# Patient Record
Sex: FEMALE | Race: WHITE | NOT HISPANIC OR LATINO | Employment: OTHER | URBAN - METROPOLITAN AREA
[De-identification: names, ages, dates, MRNs, and addresses within clinical notes are randomized per-mention and may not be internally consistent; named-entity substitution may affect disease eponyms.]

---

## 2018-12-20 ENCOUNTER — APPOINTMENT (OUTPATIENT)
Dept: WOMENS IMAGING | Facility: HOSPITAL | Age: 63
End: 2018-12-20

## 2018-12-20 PROCEDURE — 77063 BREAST TOMOSYNTHESIS BI: CPT | Performed by: RADIOLOGY

## 2018-12-20 PROCEDURE — 77067 SCR MAMMO BI INCL CAD: CPT | Performed by: RADIOLOGY

## 2020-01-06 ENCOUNTER — APPOINTMENT (OUTPATIENT)
Dept: WOMENS IMAGING | Facility: HOSPITAL | Age: 65
End: 2020-01-06

## 2020-01-06 PROCEDURE — 77067 SCR MAMMO BI INCL CAD: CPT | Performed by: RADIOLOGY

## 2020-02-04 ENCOUNTER — OFFICE VISIT (OUTPATIENT)
Dept: ORTHOPEDIC SURGERY | Facility: CLINIC | Age: 65
End: 2020-02-04

## 2020-02-04 VITALS — HEIGHT: 62 IN | BODY MASS INDEX: 28.34 KG/M2 | WEIGHT: 154 LBS | TEMPERATURE: 98.3 F

## 2020-02-04 DIAGNOSIS — M25.512 LEFT SHOULDER PAIN, UNSPECIFIED CHRONICITY: ICD-10-CM

## 2020-02-04 DIAGNOSIS — M19.012 PRIMARY OSTEOARTHRITIS OF LEFT SHOULDER: Primary | ICD-10-CM

## 2020-02-04 PROCEDURE — 99203 OFFICE O/P NEW LOW 30 MIN: CPT | Performed by: PHYSICIAN ASSISTANT

## 2020-02-04 PROCEDURE — 73030 X-RAY EXAM OF SHOULDER: CPT | Performed by: PHYSICIAN ASSISTANT

## 2020-02-04 PROCEDURE — 20610 DRAIN/INJ JOINT/BURSA W/O US: CPT | Performed by: PHYSICIAN ASSISTANT

## 2020-02-04 RX ORDER — RANITIDINE 150 MG/1
TABLET ORAL
COMMUNITY

## 2020-02-04 RX ORDER — DEXAMETHASONE SODIUM PHOSPHATE 1 MG/ML
0.5 SOLUTION/ DROPS OPHTHALMIC
COMMUNITY

## 2020-02-04 RX ORDER — PHENAZOPYRIDINE HYDROCHLORIDE 200 MG/1
TABLET, FILM COATED ORAL
COMMUNITY

## 2020-02-04 RX ORDER — PREDNISONE 20 MG/1
TABLET ORAL
COMMUNITY

## 2020-02-04 RX ORDER — TRIAMCINOLONE ACETONIDE 1 MG/ML
LOTION TOPICAL
COMMUNITY

## 2020-02-04 RX ORDER — PREDNISOLONE ACETATE 10 MG/ML
SUSPENSION/ DROPS OPHTHALMIC
COMMUNITY

## 2020-02-04 RX ORDER — TAZAROTENE 1 MG/G
GEL CUTANEOUS
COMMUNITY
Start: 2019-10-30

## 2020-02-04 RX ORDER — ESCITALOPRAM OXALATE 10 MG/1
TABLET ORAL
COMMUNITY

## 2020-02-04 RX ORDER — GUAIFENESIN AND CODEINE PHOSPHATE 100; 10 MG/5ML; MG/5ML
SOLUTION ORAL
COMMUNITY

## 2020-02-04 RX ORDER — LOVASTATIN 40 MG/1
TABLET ORAL
COMMUNITY

## 2020-02-04 RX ORDER — ESTRADIOL 10 UG/1
INSERT VAGINAL
COMMUNITY
Start: 2020-01-25

## 2020-02-04 RX ORDER — PANTOPRAZOLE SODIUM 40 MG/1
TABLET, DELAYED RELEASE ORAL
COMMUNITY

## 2020-02-04 RX ADMIN — LIDOCAINE HYDROCHLORIDE 1 ML: 10 INJECTION, SOLUTION EPIDURAL; INFILTRATION; INTRACAUDAL; PERINEURAL at 10:00

## 2020-02-04 RX ADMIN — METHYLPREDNISOLONE ACETATE 80 MG: 80 INJECTION, SUSPENSION INTRA-ARTICULAR; INTRALESIONAL; INTRAMUSCULAR; SOFT TISSUE at 10:00

## 2020-02-05 NOTE — PROGRESS NOTES
{AS New Follow Up Note Header:74453}    Patient: Shanna Espinoza  ?  YOB: 1955    MRN: [id number]  ?  Chief Complaint   Patient presents with   • Left Shoulder - Pain   • Establish Care      ?  HPI:     Pain Location: {MAKK body part:45462}  Radiation: {asradiationpain:91732}  Quality: {asquality:48723}  Intensity/Severity: {asseveritypain:15385}  Duration: {Time:19024}  Onset quality: {asonsetquality:03464}  Timing: {astimin}  Aggravating Factors: {AS Aggravating Factors Ortho:79471}  Alleviating Factors: {AS Alleviating Factors:62448}  Previous Episodes: {aspreviousepisodes:56886}  Associated Symptoms: {asassociatedsymptoms:92039}  ADLs Affected: {ADL ASMEH:13401}  Previous Treatment: ***    This patient is {new/est pt:6020182563}.  This problem {IS/ IS NOT:64564} new to this examiner.      Allergies: No Known Allergies    Medications:   Home Medications:  Current Outpatient Medications on File Prior to Visit   Medication Sig   • Calcium Carbonate Antacid (ANTACID) 1177 MG chewable tablet Antacid Extra-Strength 200 mg calcium (500 mg) chewable tablet   take 3 tablets by mouth once daily   • dexamethasone (DECADRON) 0.1 % ophthalmic solution 0.5 mL.   • escitalopram (LEXAPRO) 10 MG tablet escitalopram 10 mg tablet   • guaiFENesin-codeine (CHERATUSSIN AC) 100-10 MG/5ML liquid Cheratussin AC 10 mg-100 mg/5 mL oral liquid   • IMVEXXY MAINTENANCE PACK 10 MCG insert    • lovastatin (MEVACOR) 40 MG tablet lovastatin 40 mg tablet   • Neomycin-Polymyxin-Dexameth 0.1 % ointment neomycin-polymyxin-dexameth 3.5 mg/mL-10,000 unit/mL-0.1% eye drops   • pantoprazole (PROTONIX) 40 MG EC tablet pantoprazole 40 mg tablet,delayed release   • phenazopyridine (PYRIDIUM) 200 MG tablet phenazopyridine 200 mg tablet   take 1 tablet by mouth three times a day   • prednisoLONE acetate (PRED FORTE) 1 % ophthalmic suspension prednisolone acetate 1 % eye drops,suspension   • predniSONE (DELTASONE) 20 MG tablet prednisone 20  "mg tablet   • pseudoephedrine-acetaminophen (TYLENOL SINUS)  MG tablet Asprin Ec Low Dose 81 mg tablet,delayed release   Take 1 tablet every day by oral route.   • raNITIdine (ZANTAC) 150 MG tablet ranitidine 150 mg tablet   • TAZORAC 0.1 % gel APPLY A PEA SIZED AMOUNT TO FACE NIGHTLY AS TOLERATED   • triamcinolone (KENALOG) 0.1 % lotion triamcinolone acetonide 0.1 % lotion   APPLY A THIN LAYER TO THE AFFECTED AREA(S) BY TOPICAL ROUTE 2 TIMES PER DAY: Not for use on face     No current facility-administered medications on file prior to visit.      Current Medications:  Scheduled Meds:  PRN Meds:.    I have reviewed the patient's medical history in detail and updated the computerized patient record.  Review and summarization of old records include:    History reviewed. No pertinent past medical history.  History reviewed. No pertinent surgical history.  Social History     Occupational History   • Not on file   Tobacco Use   • Smoking status: Never Smoker   • Smokeless tobacco: Never Used   Substance and Sexual Activity   • Alcohol use: Defer   • Drug use: Defer   • Sexual activity: Defer      Social History     Social History Narrative   • Not on file     History reviewed. No pertinent family history.      Review of Systems  Constitutional: Negative.    HENT: Negative.    Eyes: Negative.    Respiratory: Negative.    Cardiovascular: Negative.    Endocrine: Negative.    Musculoskeletal: Positive for arthralgias, decreased ROM, decreased strength.  Skin: Negative.    Allergic/Immunologic: Negative.    Neurological: Negative.    Hematological: Negative.    Psychiatric/Behavioral: Negative.         Wt Readings from Last 3 Encounters:   02/04/20 69.9 kg (154 lb)     Ht Readings from Last 3 Encounters:   02/04/20 157.5 cm (62\")     Body mass index is 28.17 kg/m².  Facility age limit for growth percentiles is 20 years.  Vitals:    02/04/20 0931   Temp: 98.3 °F (36.8 °C)     Physical Exam      Physical " Exam  Constitutional: Patient is oriented to person, place, and time. Appears well-developed and well-nourished.   HENT:   Head: Normocephalic and atraumatic.   Eyes: Conjunctivae and EOM are normal. Pupils are equal, round, and reactive to light.   Cardiovascular: Normal rate and intact distal pulses.   Pulmonary/Chest: Effort normal and breath sounds normal.   Musculoskeletal:   See detailed exam below   Neurological: Alert and oriented to person, place, and time. No sensory deficit. Coordination normal.   Skin: Skin is warm and dry. Capillary refill takes less than 2 seconds. No rash noted. No erythema.   Psychiatric: Patient has a normal mood and affect. Her behavior is normal. Judgment and thought content normal.   Nursing note and vitals reviewed.      Ortho Exam:   {Shoulder Exams [facility]/AS:50827}      Diagnostics:  {Diagnostics options AS:29799}       Assessment:  Shanna was seen today for establish care and pain.    Diagnoses and all orders for this visit:    Left shoulder pain, unspecified chronicity  -     XR Shoulder 2+ View Left          Procedures  ?    Plan    · {TREATMENT OPTIONS AS:91120}  · Rest, ice, compression, and elevation (RICE) therapy  · Stretching and strengthening exercises  · Alternate OTC Ibuprofen and Tylenol as needed/if clinically indicated  · Follow up in *** {WEEKS/MONTHS:92818}    Date of encounter: 02/04/2020   Brad Hackett PA-C    Electronically signed by Brad Hackett PA-C, 02/05/20, 7:55 AM.

## 2020-02-14 PROBLEM — M19.012 PRIMARY OSTEOARTHRITIS OF LEFT SHOULDER: Status: ACTIVE | Noted: 2020-02-14

## 2020-02-14 PROBLEM — M25.512 LEFT SHOULDER PAIN: Status: ACTIVE | Noted: 2020-02-14

## 2020-02-14 RX ORDER — METHYLPREDNISOLONE ACETATE 80 MG/ML
80 INJECTION, SUSPENSION INTRA-ARTICULAR; INTRALESIONAL; INTRAMUSCULAR; SOFT TISSUE
Status: COMPLETED | OUTPATIENT
Start: 2020-02-04 | End: 2020-02-04

## 2020-02-14 RX ORDER — LIDOCAINE HYDROCHLORIDE 10 MG/ML
1 INJECTION, SOLUTION EPIDURAL; INFILTRATION; INTRACAUDAL; PERINEURAL
Status: COMPLETED | OUTPATIENT
Start: 2020-02-04 | End: 2020-02-04

## 2020-02-14 NOTE — PROGRESS NOTES
NEW VISIT    Patient: Shanna Espinoza  ?  YOB: 1955    MRN: [id number]  ?  Chief Complaint   Patient presents with   • Left Shoulder - Pain   • Establish Care      ?  HPI:   Shanna Espinoza is a 64 y.o. female who presents with complaint of left shoulder pain for approximately 2 months.  She denies any injury to the shoulder. 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 She has been taking ibuprofen with some relief.     Pain Location: left shoulder  Radiation: into the lateral aspect   Quality: aching, burning  Intensity/Severity: mild-moderate  Duration: 2 months  Onset quality: gradual   Timing: intermittent  Aggravating Factors: overhead reaching, reaching backward, reaching across body, lifting heavy objects  Alleviating Factors: NSAIDs, rest  Previous Episodes: none mentioned  Associated Symptoms: pain, decreased ROM  ADLs Affected: work related activities, recreational activities/sports     This patient is a new patient.  This problem is new to this examiner.      Allergies: No Known Allergies    Medications:   Home Medications:  Current Outpatient Medications on File Prior to Visit   Medication Sig   • Calcium Carbonate Antacid (ANTACID) 1177 MG chewable tablet Antacid Extra-Strength 200 mg calcium (500 mg) chewable tablet   take 3 tablets by mouth once daily   • dexamethasone (DECADRON) 0.1 % ophthalmic solution 0.5 mL.   • escitalopram (LEXAPRO) 10 MG tablet escitalopram 10 mg tablet   • guaiFENesin-codeine (CHERATUSSIN AC) 100-10 MG/5ML liquid Cheratussin AC 10 mg-100 mg/5 mL oral liquid   • IMVEXXY MAINTENANCE PACK 10 MCG insert    • lovastatin (MEVACOR) 40 MG tablet lovastatin 40 mg tablet   • Neomycin-Polymyxin-Dexameth 0.1 % ointment neomycin-polymyxin-dexameth 3.5 mg/mL-10,000 unit/mL-0.1% eye drops   • pantoprazole (PROTONIX) 40 MG EC tablet pantoprazole 40 mg tablet,delayed release   • phenazopyridine (PYRIDIUM) 200 MG tablet phenazopyridine 200 mg tablet   take 1  tablet by mouth three times a day   • prednisoLONE acetate (PRED FORTE) 1 % ophthalmic suspension prednisolone acetate 1 % eye drops,suspension   • predniSONE (DELTASONE) 20 MG tablet prednisone 20 mg tablet   • pseudoephedrine-acetaminophen (TYLENOL SINUS)  MG tablet Asprin Ec Low Dose 81 mg tablet,delayed release   Take 1 tablet every day by oral route.   • raNITIdine (ZANTAC) 150 MG tablet ranitidine 150 mg tablet   • TAZORAC 0.1 % gel APPLY A PEA SIZED AMOUNT TO FACE NIGHTLY AS TOLERATED   • triamcinolone (KENALOG) 0.1 % lotion triamcinolone acetonide 0.1 % lotion   APPLY A THIN LAYER TO THE AFFECTED AREA(S) BY TOPICAL ROUTE 2 TIMES PER DAY: Not for use on face     No current facility-administered medications on file prior to visit.      Current Medications:  Scheduled Meds:  PRN Meds:.    I have reviewed the patient's medical history in detail and updated the computerized patient record.  Review and summarization of old records include:    Past Medical History:   Diagnosis Date   • Anxiety    • GERD (gastroesophageal reflux disease)    • Hyperlipidemia    • Vitamin D insufficiency      Past Surgical History:   Procedure Laterality Date   •  SECTION      5     Social History     Occupational History   • Not on file   Tobacco Use   • Smoking status: Never Smoker   • Smokeless tobacco: Never Used   Substance and Sexual Activity   • Alcohol use: Defer   • Drug use: Defer   • Sexual activity: Defer      Family History   Problem Relation Age of Onset   • Cancer Other    • Diabetes Other    • Rheumatologic disease Granddaughter          Review of Systems  Constitutional: Negative.    HENT: Negative.    Eyes: Negative.    Respiratory: Negative.    Cardiovascular: Negative.    Endocrine: Negative.    Musculoskeletal: Positive for arthralgias, decreased ROM, decreased strength.  Skin: Negative.    Allergic/Immunologic: Negative.    Neurological: Negative    Hematological: Negative.   "  Psychiatric/Behavioral: Negative.           Wt Readings from Last 3 Encounters:   02/04/20 69.9 kg (154 lb)     Ht Readings from Last 3 Encounters:   02/04/20 157.5 cm (62\")     Body mass index is 28.17 kg/m².  Facility age limit for growth percentiles is 20 years.  Vitals:    02/04/20 0931   Temp: 98.3 °F (36.8 °C)         Physical Exam  Constitutional: Patient is oriented to person, place, and time. Appears well-developed and well-nourished.   HENT:   Head: Normocephalic and atraumatic.   Eyes: Conjunctivae and EOM are normal. Pupils are equal, round, and reactive to light.   Cardiovascular: Normal rate and intact distal pulses.   Pulmonary/Chest: Effort normal and breath sounds normal.   Musculoskeletal:   See detailed exam below   Neurological: Alert and oriented to person, place, and time. No sensory deficit. Coordination normal.   Skin: Skin is warm and dry. Capillary refill takes less than 2 seconds. No rash noted. No erythema.   Psychiatric: Patient has a normal mood and affect. Her behavior is normal. Judgment and thought content normal.   Nursing note and vitals reviewed.      Ortho Exam:   Active and passive range of motion limited, although passive less than active.    There is obvious crepitus noted during motion testing.    Overall strength/rotator cuff function is mildly impaired.    AC joint is tender upon palpation.    Axillary nerve function is well-preserved.    There is slight winging of the scapula with hollowing of the fossae over the proximal and distal aspects of the spine of the scapula.    Active range of motion as follows: forward flexion is 0-120 degrees, active abduction is 0-110 degrees.    Drop arm sign is negative.    Skin and soft tissues are normal.    Patient's pain level is 5.          Diagnostics:  XR - 1 Result   I have personally reviewed   Results for orders placed in visit on 02/04/20   XR SHOULDER 2+ VW LEFT 2/4/2020    and my interpretation of the image is as follows: "   Findings: Moderate findings of osteoarthritis indicated by glenohumeral joint space narrowing but appears to be bone-on-bone  Bony lesion: no  Soft tissues: within normal limits  Joint spaces: decreased  Hardware appropriately positioned: not applicable  Prior studies available for comparison: no          Assessment:  Shanna was seen today for establish care and pain.    Diagnoses and all orders for this visit:    Primary osteoarthritis of left shoulder  -     Large Joint Arthrocentesis: L glenohumeral    Left shoulder pain, unspecified chronicity  -     XR Shoulder 2+ View Left  -     Large Joint Arthrocentesis: L glenohumeral          Large Joint Arthrocentesis: L glenohumeral  Date/Time: 2/4/2020 10:00 AM  Consent given by: patient  Site marked: site marked  Timeout: Immediately prior to procedure a time out was called to verify the correct patient, procedure, equipment, support staff and site/side marked as required   Supporting Documentation  Indications: pain   Procedure Details  Location: shoulder - L glenohumeral  Preparation: Patient was prepped and draped in the usual sterile fashion  Needle size: 25 G  Approach: posterior  Medications administered: 80 mg methylPREDNISolone acetate 80 MG/ML; 1 mL lidocaine PF 1% 1 %  Patient tolerance: patient tolerated the procedure well with no immediate complications        ?    Plan    · Natural history and expected course discussed. Questions answered.  · Reduction in offending activity.  · Gentle ROM exercises.  · Rest, ice, compression, and elevation (RICE) therapy.  · OTC analgesics as needed.  · Plain film x-rays performed, reviewed and discussed.  · Joint injection. See procedure note.  · Follow up in 3 months    Brad Hackett PA-C  Date of encounter: 02/04/2020     Electronically signed by Brad Hackett PA-C, 02/13/20, 10:24 PM.

## 2020-05-05 ENCOUNTER — OFFICE VISIT (OUTPATIENT)
Dept: ORTHOPEDIC SURGERY | Facility: CLINIC | Age: 65
End: 2020-05-05

## 2020-05-05 DIAGNOSIS — M19.012 PRIMARY OSTEOARTHRITIS OF LEFT SHOULDER: Primary | ICD-10-CM

## 2020-05-05 DIAGNOSIS — M79.671 RIGHT FOOT PAIN: ICD-10-CM

## 2020-05-05 PROCEDURE — 20610 DRAIN/INJ JOINT/BURSA W/O US: CPT | Performed by: PHYSICIAN ASSISTANT

## 2020-05-05 PROCEDURE — 99213 OFFICE O/P EST LOW 20 MIN: CPT | Performed by: PHYSICIAN ASSISTANT

## 2020-05-05 RX ADMIN — METHYLPREDNISOLONE ACETATE 160 MG: 80 INJECTION, SUSPENSION INTRA-ARTICULAR; INTRALESIONAL; INTRAMUSCULAR; SOFT TISSUE at 11:40

## 2020-05-05 RX ADMIN — LIDOCAINE HYDROCHLORIDE 2 ML: 10 INJECTION, SOLUTION EPIDURAL; INFILTRATION; INTRACAUDAL; PERINEURAL at 11:40

## 2020-05-11 PROBLEM — M79.671 RIGHT FOOT PAIN: Status: ACTIVE | Noted: 2020-05-11

## 2020-05-11 RX ORDER — LIDOCAINE HYDROCHLORIDE 10 MG/ML
2 INJECTION, SOLUTION EPIDURAL; INFILTRATION; INTRACAUDAL; PERINEURAL
Status: COMPLETED | OUTPATIENT
Start: 2020-05-05 | End: 2020-05-05

## 2020-05-11 RX ORDER — METHYLPREDNISOLONE ACETATE 80 MG/ML
160 INJECTION, SUSPENSION INTRA-ARTICULAR; INTRALESIONAL; INTRAMUSCULAR; SOFT TISSUE
Status: COMPLETED | OUTPATIENT
Start: 2020-05-05 | End: 2020-05-05

## 2020-05-11 NOTE — PROGRESS NOTES
FOLLOW UP VISIT    Patient: Shanna Espinoza  ?  YOB: 1955    MRN: [id number]  ?  Chief Complaint   Patient presents with   • Left Shoulder - Follow-up, Pain      ?  HPI:    64 y.o. female presents for follow up of left shoulder pain. I initially saw her 3 months ago, at which point the pain had been present for approximately 2 months.  She reports increased popping of the shoulder with movements since last visit. I injected her shoulder with steroid last visit and although it helped tremendously, it did not completely alleviate the pain. She also reports pain in the right foot x 2weeks after running into something with her foot.     Pain Location: left shoulder  Radiation: into the lateral aspect   Quality: aching, burning  Intensity/Severity: mild-moderate  Duration: 5 months  Onset quality: gradual   Timing: intermittent  Aggravating Factors: overhead reaching, reaching backward, reaching across body, lifting heavy objects  Alleviating Factors: NSAIDs, rest  Previous Episodes: none mentioned  Associated Symptoms: pain, decreased ROM  ADLs Affected: work related activities, recreational activities/sports   The above HPI elements are historical findings, although they have been updated to reflect current findings.    This patient is an established patient.  This problem is not new to this examiner.      Allergies: No Known Allergies    Medications:   Home Medications:  Current Outpatient Medications on File Prior to Visit   Medication Sig   • Calcium Carbonate Antacid (ANTACID) 1177 MG chewable tablet Antacid Extra-Strength 200 mg calcium (500 mg) chewable tablet   take 3 tablets by mouth once daily   • dexamethasone (DECADRON) 0.1 % ophthalmic solution 0.5 mL.   • escitalopram (LEXAPRO) 10 MG tablet escitalopram 10 mg tablet   • guaiFENesin-codeine (CHERATUSSIN AC) 100-10 MG/5ML liquid Cheratussin AC 10 mg-100 mg/5 mL oral liquid   • IMVEXXY MAINTENANCE PACK 10 MCG insert    • lovastatin (MEVACOR) 40 MG  tablet lovastatin 40 mg tablet   • Neomycin-Polymyxin-Dexameth 0.1 % ointment neomycin-polymyxin-dexameth 3.5 mg/mL-10,000 unit/mL-0.1% eye drops   • pantoprazole (PROTONIX) 40 MG EC tablet pantoprazole 40 mg tablet,delayed release   • phenazopyridine (PYRIDIUM) 200 MG tablet phenazopyridine 200 mg tablet   take 1 tablet by mouth three times a day   • prednisoLONE acetate (PRED FORTE) 1 % ophthalmic suspension prednisolone acetate 1 % eye drops,suspension   • predniSONE (DELTASONE) 20 MG tablet prednisone 20 mg tablet   • pseudoephedrine-acetaminophen (TYLENOL SINUS)  MG tablet Asprin Ec Low Dose 81 mg tablet,delayed release   Take 1 tablet every day by oral route.   • raNITIdine (ZANTAC) 150 MG tablet ranitidine 150 mg tablet   • TAZORAC 0.1 % gel APPLY A PEA SIZED AMOUNT TO FACE NIGHTLY AS TOLERATED   • triamcinolone (KENALOG) 0.1 % lotion triamcinolone acetonide 0.1 % lotion   APPLY A THIN LAYER TO THE AFFECTED AREA(S) BY TOPICAL ROUTE 2 TIMES PER DAY: Not for use on face     No current facility-administered medications on file prior to visit.      Current Medications:  Scheduled Meds:  PRN Meds:.    I have reviewed the patient's medical history in detail and updated the computerized patient record.  Review and summarization of old records include:    Past Medical History:   Diagnosis Date   • Anxiety    • GERD (gastroesophageal reflux disease)    • Hyperlipidemia    • Vitamin D insufficiency      Past Surgical History:   Procedure Laterality Date   •  SECTION      5     Social History     Occupational History   • Not on file   Tobacco Use   • Smoking status: Never Smoker   • Smokeless tobacco: Never Used   Substance and Sexual Activity   • Alcohol use: Defer   • Drug use: Defer   • Sexual activity: Defer      Family History   Problem Relation Age of Onset   • Cancer Other    • Diabetes Other    • Rheumatologic disease Granddaughter          Review of Systems  Constitutional: Negative.    HENT:  "Negative.    Eyes: Negative.    Respiratory: Negative.    Cardiovascular: Negative.    Endocrine: Negative.    Musculoskeletal: Positive for arthralgias.  Skin: Negative.    Allergic/Immunologic: Negative.    Neurological: Negative    Hematological: Negative.    Psychiatric/Behavioral: Negative.           Wt Readings from Last 3 Encounters:   02/04/20 69.9 kg (154 lb)     Ht Readings from Last 3 Encounters:   02/04/20 157.5 cm (62\")     There is no height or weight on file to calculate BMI.  No height and weight on file for this encounter.  There were no vitals filed for this visit.      Physical Exam  Constitutional: Patient is oriented to person, place, and time. Appears well-developed and well-nourished.   HENT:   Head: Normocephalic and atraumatic.   Eyes: Conjunctivae and EOM are normal. Pupils are equal, round, and reactive to light.   Cardiovascular: Intact distal pulses.   Pulmonary/Chest: Effort normal.   Musculoskeletal:   See detailed exam below   Neurological: Alert and oriented to person, place, and time. No sensory deficit. Coordination normal.   Skin: Skin is warm and dry. Capillary refill takes less than 2 seconds. No rash noted. No erythema.   Psychiatric: Patient has a normal mood and affect. Her behavior is normal. Judgment and thought content normal.   Nursing note and vitals reviewed.      Ortho Exam:   Active and passive range of motion limited, although passive less than active.    There is obvious crepitus noted during motion testing.    Overall strength/rotator cuff function is mildly impaired.    AC joint is tender upon palpation.    Axillary nerve function is well-preserved.    There is slight winging of the scapula with hollowing of the fossae over the proximal and distal aspects of the spine of the scapula.    Active range of motion as follows: forward flexion is 0-120 degrees, active abduction is 0-110 degrees.    Drop arm sign is negative.    Skin and soft tissues are normal.  "   Patient's pain level is 3.    The above exam is a historical finding, although remains the unchanged.       Assessment:  Shanna was seen today for follow-up and pain.    Diagnoses and all orders for this visit:    Primary osteoarthritis of left shoulder  -     Large Joint Arthrocentesis: L glenohumeral    Right foot pain  -     XR Toe 2+ View Right; Future  -     XR Foot 3+ View Right          Large Joint Arthrocentesis: L glenohumeral  Date/Time: 5/5/2020 11:40 AM  Consent given by: patient  Site marked: site marked  Timeout: Immediately prior to procedure a time out was called to verify the correct patient, procedure, equipment, support staff and site/side marked as required   Supporting Documentation  Indications: pain   Procedure Details  Location: shoulder - L glenohumeral  Preparation: Patient was prepped and draped in the usual sterile fashion  Needle size: 25 G  Approach: anteromedial  Medications administered: 160 mg methylPREDNISolone acetate 80 MG/ML; 2 mL lidocaine PF 1% 1 %  Patient tolerance: patient tolerated the procedure well with no immediate complications               Plan    Discussed further treatment for shoulder pain and expectations regarding symptoms. Since I administered a lower dose of steroid last visit I will do another injection with a higher dose to see if it provides better relief. Patient would like to proceed with this.   Left shoulder steroid injection was discussed with the patient. Discussed indication, risks, benefits, and alternatives. Verbal consent was given to proceed with the procedure.   Injection was performed from anteromedial approach.  Patient tolerated the procedure well and no complications were encountered.  Discussion of orthopedic goals and activities and patient/guardian expressed understanding.  Ice, heat, rest, compression and elevation of extremity as beneficial  nsaids and/or tylenol as beneficial  Instructed to refrain from heavy activity/rest the  extremity for the next 24-48 hours  Discussion regarding possibility of cortisol flare and what to expect if this occurs  Watch for signs and symptoms of infection  Call if adverse effect from injection therapy   · Reduction in offending activity  · Gentle ROM exercises  · Order given for xrays of the foot, to be performed at [facility] or [facility], if she decides to do so.  · Follow up in 3 months    Brad Hackett PA-C  Date of encounter: 05/05/2020     Electronically signed by Brad Hackett PA-C, 05/11/20, 8:19 AM.

## 2020-05-12 ENCOUNTER — TELEPHONE (OUTPATIENT)
Dept: ORTHOPEDIC SURGERY | Facility: CLINIC | Age: 65
End: 2020-05-12

## 2020-05-12 DIAGNOSIS — S92.501A CLOSED FRACTURE OF PHALANX OF RIGHT FIFTH TOE, INITIAL ENCOUNTER: Primary | ICD-10-CM

## 2020-05-12 NOTE — TELEPHONE ENCOUNTER
Left message advising her right fifth toe shows a minimally displaced and comminuted fracture, I explained this in layman's terms.  I advised she can mary tape the toes as well as come by and  a short cam walking boot for protection.  She will need to wear this for approximately 3 to 4 weeks for protection and support.

## 2020-06-04 DIAGNOSIS — S92.501A CLOSED FRACTURE OF PHALANX OF RIGHT FIFTH TOE, INITIAL ENCOUNTER: Primary | ICD-10-CM

## 2020-06-08 ENCOUNTER — HOSPITAL ENCOUNTER (OUTPATIENT)
Dept: OTHER | Facility: HOSPITAL | Age: 65
Discharge: HOME | End: 2020-06-08
Attending: PHYSICIAN ASSISTANT

## 2020-06-08 ENCOUNTER — OFFICE VISIT (OUTPATIENT)
Dept: ORTHOPEDIC SURGERY | Facility: CLINIC | Age: 65
End: 2020-06-08

## 2020-06-08 VITALS — BODY MASS INDEX: 26.68 KG/M2 | WEIGHT: 145 LBS | HEIGHT: 62 IN | TEMPERATURE: 97.1 F

## 2020-06-08 DIAGNOSIS — S92.501D CLOSED FRACTURE OF PHALANX OF RIGHT FIFTH TOE WITH ROUTINE HEALING, SUBSEQUENT ENCOUNTER: Primary | ICD-10-CM

## 2020-06-08 PROCEDURE — 99213 OFFICE O/P EST LOW 20 MIN: CPT | Performed by: PHYSICIAN ASSISTANT

## 2020-06-17 PROBLEM — S92.501A CLOSED FRACTURE OF FIFTH TOE OF RIGHT FOOT: Status: ACTIVE | Noted: 2020-06-17

## 2020-06-17 NOTE — PROGRESS NOTES
NEW VISIT    Patient: Shanna Espinoza  ?  YOB: 1955    MRN: [id number]  ?  Chief Complaint   Patient presents with   • Right Foot - Pain, Follow-up      ?  HPI: Shanna Espinoza is a 64 y.o. female who presents for new complaint of right foot pain, particularly of the fifth toe.  At her last visit with me she mentioned she had an injury to the foot and I ordered x-rays to be done outside of our office.  Once those x-rays came back I notified her there was a right fifth toe fracture and I placed her into a short cam walking boot.  She is now been in the boot for 4 weeks and is reporting a significant improvement in discomfort.  She has been putting some weight on the foot with the boot on has done fairly well.  Today is her first official visit for this fracture.      Pain Location: right foot  Radiation: none  Quality: aching  Intensity/Severity: mild   Duration: 6-7 weeks  Onset quality: sudden  Timing: constant  Aggravating Factors: any weight bearing, walking, standing  Alleviating Factors: Tylenol, NSAIDs, rest, brace  Previous Episodes: none mentioned  Associated Symptoms: pain, swelling  ADLs Affected: grooming/hygiene/toileting/personal care, ambulating, work related activities, recreational activities/sports      This patient is an established patient.  This problem is new to this examiner.      Allergies: No Known Allergies    Medications:   Home Medications:  Current Outpatient Medications on File Prior to Visit   Medication Sig   • Calcium Carbonate Antacid (ANTACID) 1177 MG chewable tablet Antacid Extra-Strength 200 mg calcium (500 mg) chewable tablet   take 3 tablets by mouth once daily   • dexamethasone (DECADRON) 0.1 % ophthalmic solution 0.5 mL.   • escitalopram (LEXAPRO) 10 MG tablet escitalopram 10 mg tablet   • guaiFENesin-codeine (CHERATUSSIN AC) 100-10 MG/5ML liquid Cheratussin AC 10 mg-100 mg/5 mL oral liquid   • IMVEXXY MAINTENANCE PACK 10 MCG insert    • lovastatin (MEVACOR) 40 MG  tablet lovastatin 40 mg tablet   • Neomycin-Polymyxin-Dexameth 0.1 % ointment neomycin-polymyxin-dexameth 3.5 mg/mL-10,000 unit/mL-0.1% eye drops   • pantoprazole (PROTONIX) 40 MG EC tablet pantoprazole 40 mg tablet,delayed release   • phenazopyridine (PYRIDIUM) 200 MG tablet phenazopyridine 200 mg tablet   take 1 tablet by mouth three times a day   • prednisoLONE acetate (PRED FORTE) 1 % ophthalmic suspension prednisolone acetate 1 % eye drops,suspension   • predniSONE (DELTASONE) 20 MG tablet prednisone 20 mg tablet   • pseudoephedrine-acetaminophen (TYLENOL SINUS)  MG tablet Asprin Ec Low Dose 81 mg tablet,delayed release   Take 1 tablet every day by oral route.   • raNITIdine (ZANTAC) 150 MG tablet ranitidine 150 mg tablet   • TAZORAC 0.1 % gel APPLY A PEA SIZED AMOUNT TO FACE NIGHTLY AS TOLERATED   • triamcinolone (KENALOG) 0.1 % lotion triamcinolone acetonide 0.1 % lotion   APPLY A THIN LAYER TO THE AFFECTED AREA(S) BY TOPICAL ROUTE 2 TIMES PER DAY: Not for use on face     No current facility-administered medications on file prior to visit.      Current Medications:  Scheduled Meds:  PRN Meds:.    I have reviewed the patient's medical history in detail and updated the computerized patient record.  Review and summarization of old records include:    Past Medical History:   Diagnosis Date   • Anxiety    • GERD (gastroesophageal reflux disease)    • Hyperlipidemia    • Vitamin D insufficiency      Past Surgical History:   Procedure Laterality Date   •  SECTION      5     Social History     Occupational History   • Not on file   Tobacco Use   • Smoking status: Never Smoker   • Smokeless tobacco: Never Used   Substance and Sexual Activity   • Alcohol use: Defer   • Drug use: Defer   • Sexual activity: Defer     Family History   Problem Relation Age of Onset   • Cancer Other    • Diabetes Other    • Rheumatologic disease Granddaughter          Review of Systems   Constitutional: Negative.  Negative for  "fever.   Eyes: Negative.    Respiratory: Negative.    Cardiovascular: Negative.    Endocrine: Negative.    Musculoskeletal: Positive for arthralgias, gait problem and joint swelling.   Skin: Positive for color change. Negative for rash and wound.   Allergic/Immunologic: Negative.    Neurological: Negative for numbness.   Hematological: Negative.    Psychiatric/Behavioral: Negative.           Wt Readings from Last 3 Encounters:   06/08/20 65.8 kg (145 lb)   02/04/20 69.9 kg (154 lb)     Ht Readings from Last 3 Encounters:   06/08/20 157.5 cm (62\")   02/04/20 157.5 cm (62\")     Body mass index is 26.52 kg/m².  Facility age limit for growth percentiles is 20 years.  Vitals:    06/08/20 1323   Temp: 97.1 °F (36.2 °C)         Physical Exam  Constitutional: Patient is oriented to person, place, and time. Appears well-developed and well-nourished.   HENT:   Head: Normocephalic and atraumatic.   Eyes: Conjunctivae and EOM are normal. Pupils are equal, round, and reactive to light.   Cardiovascular: Normal rate and intact distal pulses.   Pulmonary/Chest: Effort normal.   Musculoskeletal:   See detailed exam below   Neurological: Alert and oriented to person, place, and time. No sensory deficit. Coordination normal.   Skin: Skin is warm and dry. Capillary refill takes less than 2 seconds. No rash noted. No erythema.   Psychiatric: Patient has a normal mood and affect. Her behavior is normal. Judgment and thought content normal.   Nursing note and vitals reviewed.        Ortho Exam:   Right foot  Positive for swelling and tenderness of the right foot, particularly noted over the fifth phalanx.   Lisfranc joint is stable.    Appropriate amounts of swelling is noted.  All bruising has resolved at this point.  There is no evidence of a compartmental syndrome or instability of the midfoot.   Dorsalis pedis and posterior tibial artery pulses are palpable.   Common peroneal nerve function is well preserved.   Dorsiflexion and " plantarflexion are both mildly restricted due to pain associated with the fifth phalanx.    The arches of the foot are fairly well preserved.   Patient's gait is cautious and antalgic.   The ankle mortise is stable.       Diagnostics:  Foot x-rays were ordered by Brad Hackett PA-C and performed at [facility]. These images were independently viewed and interpreted by myself, my impression as follows:  4v Right Foot xrays:  Findings: Evidence of good callus formation at the base of the proximal phalanx where a comminuted fracture is noted.  Bony lesion: no  Soft tissues: within normal limits  Joint spaces: within normal limits  Hardware appropriately positioned: not applicable  Prior studies available for comparison: yes     Assessment:  Shanna was seen today for pain and follow-up.    Diagnoses and all orders for this visit:    Closed fracture of phalanx of right fifth toe with routine healing, subsequent encounter  -     XR Foot 3+ View Right; Future      ?    Plan    · She was instructed to continue with Cam walking boot and to weight-bear as tolerated over the next 2 weeks and then gradually return to her normal shoes  · Closed treatment of fracture and or dislocation.  • Discussion of orthopaedic goals and activities and patient and/or guardian expressed appreciation.  • Ice, heat, and/or modalities as beneficial  • Cast, splint or brace care and assistive device usage instructions given  • Reduced physical activity as appropriate and avoid offending activity  • Weight bearing parameters reviewed  • Reinjury Precautions  • Patient is encouraged to call or return for any issues or concerns.  • No brace was given at today's visit.  • Follow up in 4 weeks    Date of encounter: 06/08/2020   Brad Hackett PA-C      Electronically signed by Brad Hackett PA-C, 06/17/20, 7:41 AM.

## 2020-07-06 ENCOUNTER — HOSPITAL ENCOUNTER (OUTPATIENT)
Dept: OTHER | Facility: HOSPITAL | Age: 65
Discharge: HOME | End: 2020-07-06
Attending: PHYSICIAN ASSISTANT

## 2020-07-06 ENCOUNTER — OFFICE VISIT (OUTPATIENT)
Dept: ORTHOPEDIC SURGERY | Facility: CLINIC | Age: 65
End: 2020-07-06

## 2020-07-06 VITALS — WEIGHT: 145 LBS | BODY MASS INDEX: 26.68 KG/M2 | HEIGHT: 62 IN | TEMPERATURE: 97.5 F

## 2020-07-06 DIAGNOSIS — S92.501D CLOSED FRACTURE OF PHALANX OF RIGHT FIFTH TOE WITH ROUTINE HEALING, SUBSEQUENT ENCOUNTER: Primary | ICD-10-CM

## 2020-07-06 PROCEDURE — 99024 POSTOP FOLLOW-UP VISIT: CPT | Performed by: PHYSICIAN ASSISTANT

## 2020-07-16 NOTE — PROGRESS NOTES
FRACTURE CARE VISIT       NAME: Shanna Espinoza           : 1955    MRN: [id number]      Chief Complaint   Patient presents with   • Right Foot - Follow-up, Pain     Date of Fracture: Mid to late 2020  ?     HPI  Shanna Espinoza presents for 8 week follow up s/p fracture of right fifth toe. She has had no current complaints. She reports overall a significant improvement in pain.  She reports she wore the boot for another couple of weeks and has transition out of it without problem.        Physical Exam  General: alert, appears stated age, cooperative and no distress  Physical Exam:  Signs of infection: [x] no                  [] yes   Swelling: [x] no                  [] yes   Skin wound: [] healing well   [] healed well   [x] skin intact    Motor exam intact: [] no                  [x] yes   Neurovascular exam intact: [] no                  [x] yes   Signs of compartment syndrome: [x] no                  [] yes   Signs of DVT: [x] no                  [] yes   Ecchymosis: [x] no                  [] yes     Musculoskeletal:   Right foot  Negative for swelling but positive for minimal tenderness of the right foot, particularly noted over the fifth phalanx.   Lisfranc joint is stable.     All bruising has resolved at this point.  There is no evidence of a compartmental syndrome or instability of the midfoot.   Dorsalis pedis and posterior tibial artery pulses are palpable.   Common peroneal nerve function is well preserved.   Dorsiflexion and plantarflexion are both mildly restricted due to pain associated with the fifth phalanx.    The arches of the foot are fairly well preserved.   Patient's gait is cautious and antalgic.   The ankle mortise is stable.       Diagnostic Data:  Right foot xrays 3views were ordered by Brad Hackett PA-C and performed at [facility]. These images were independently viewed and interpreted by myself, my impression as follows:  Findings: Great callus  formation of base of the proximal fifth phalanx right foot  Bony lesion: no  Soft tissues: within normal limits  Joint spaces: within normal limits  Hardware appropriately positioned: not applicable  Prior studies available for comparison: Yes with noticeable improvement        Assessment/Plan   Assessment:    1. Closed fracture of phalanx of right fifth toe with routine healing, subsequent encounter          Plan:   Orders Placed This Encounter   Procedures   • XR Toe 2+ View Right   • XR Foot 3+ View Right      • Discussion of orthopaedic goals and activities.  • Risk, benefits, and merits of treatment alternatives reviewed with the patient.  • Ice, heat, and/or modalities as beneficial  • Elevate foot for residual swelling  • Patient is encouraged to call or return for any issues or concerns.  •   • Follow up in 4 weeks         Brad Hackett PA-C  07/06/2020     Electronically signed by Brad Hackett PA-C, 07/15/20, 9:52 PM.    EMR Dragon/Transcription disclaimer:  Much of this encounter note is an electronic transcription/translation of spoken language to printed text. The electronic translation of spoken language may permit erroneous, or at times, nonsensical words or phrases to be inadvertently transcribed; Although I have reviewed the note for such errors, some may still exist.

## 2020-08-05 ENCOUNTER — OFFICE VISIT (OUTPATIENT)
Dept: ORTHOPEDIC SURGERY | Facility: CLINIC | Age: 65
End: 2020-08-05

## 2020-08-05 ENCOUNTER — HOSPITAL ENCOUNTER (OUTPATIENT)
Dept: OTHER | Facility: HOSPITAL | Age: 65
Discharge: HOME | End: 2020-08-05
Attending: PHYSICIAN ASSISTANT

## 2020-08-05 VITALS — BODY MASS INDEX: 26.68 KG/M2 | HEIGHT: 62 IN | WEIGHT: 145 LBS

## 2020-08-05 DIAGNOSIS — S92.501D CLOSED FRACTURE OF PHALANX OF RIGHT FIFTH TOE WITH ROUTINE HEALING, SUBSEQUENT ENCOUNTER: Primary | ICD-10-CM

## 2020-08-05 DIAGNOSIS — M19.012 PRIMARY OSTEOARTHRITIS OF LEFT SHOULDER: ICD-10-CM

## 2020-08-05 DIAGNOSIS — M12.812 ROTATOR CUFF ARTHROPATHY OF LEFT SHOULDER: ICD-10-CM

## 2020-08-05 PROCEDURE — 99213 OFFICE O/P EST LOW 20 MIN: CPT | Performed by: NURSE PRACTITIONER

## 2020-08-05 PROCEDURE — 20610 DRAIN/INJ JOINT/BURSA W/O US: CPT | Performed by: NURSE PRACTITIONER

## 2020-08-05 RX ORDER — METHYLPREDNISOLONE ACETATE 80 MG/ML
160 INJECTION, SUSPENSION INTRA-ARTICULAR; INTRALESIONAL; INTRAMUSCULAR; SOFT TISSUE
Status: COMPLETED | OUTPATIENT
Start: 2020-08-05 | End: 2020-08-05

## 2020-08-05 RX ORDER — LIDOCAINE HYDROCHLORIDE 10 MG/ML
2 INJECTION, SOLUTION INFILTRATION; PERINEURAL
Status: COMPLETED | OUTPATIENT
Start: 2020-08-05 | End: 2020-08-05

## 2020-08-05 RX ADMIN — LIDOCAINE HYDROCHLORIDE 2 ML: 10 INJECTION, SOLUTION INFILTRATION; PERINEURAL at 11:07

## 2020-08-05 RX ADMIN — METHYLPREDNISOLONE ACETATE 160 MG: 80 INJECTION, SUSPENSION INTRA-ARTICULAR; INTRALESIONAL; INTRAMUSCULAR; SOFT TISSUE at 11:07

## 2020-08-05 NOTE — PROGRESS NOTES
Large Joint Arthrocentesis: L subacromial bursa  Date/Time: 8/5/2020 11:07 AM  Consent given by: patient  Site marked: site marked  Timeout: Immediately prior to procedure a time out was called to verify the correct patient, procedure, equipment, support staff and site/side marked as required   Supporting Documentation  Indications: pain   Procedure Details  Location: shoulder - L subacromial bursa  Preparation: Patient was prepped and draped in the usual sterile fashion  Needle size: 25 G  Approach: anteromedial  Medications administered: 2 mL lidocaine 1 %; 160 mg methylPREDNISolone acetate 80 MG/ML  Patient tolerance: patient tolerated the procedure well with no immediate complications        Chief Complaint   Patient presents with   • Left Shoulder - Follow-up   • Right Foot - Pain       History of Present Illness  HPI: Shanna Espinoza is a 65 y.o. female present today for follow up for right fifth phalynx fracture     Review of Systems   Constitutional: Negative.    HENT: Negative.    Cardiovascular: Negative.    Musculoskeletal: Positive for joint swelling. Negative for arthralgias, back pain and gait problem.   Skin: Negative.    Allergic/Immunologic: Negative.    Neurological: Negative.    Hematological: Negative.    Psychiatric/Behavioral: Negative.        Patient Active Problem List   Diagnosis   • Left shoulder pain   • Primary osteoarthritis of left shoulder   • Right foot pain   • Closed fracture of fifth toe of right foot     Past Medical History:   Diagnosis Date   • Anxiety    • GERD (gastroesophageal reflux disease)    • Hyperlipidemia    • Vitamin D insufficiency      Social History     Socioeconomic History   • Marital status:      Spouse name: Not on file   • Number of children: Not on file   • Years of education: Not on file   • Highest education level: Not on file   Tobacco Use   • Smoking status: Never Smoker   • Smokeless tobacco: Never Used   Substance and Sexual Activity   • Alcohol  use: Defer   • Drug use: Defer   • Sexual activity: Defer     Past Surgical History:   Procedure Laterality Date   •  SECTION      5       Current Outpatient Medications:   •  Calcium Carbonate Antacid (ANTACID) 1177 MG chewable tablet, Antacid Extra-Strength 200 mg calcium (500 mg) chewable tablet  take 3 tablets by mouth once daily, Disp: , Rfl:   •  dexamethasone (DECADRON) 0.1 % ophthalmic solution, 0.5 mL., Disp: , Rfl:   •  escitalopram (LEXAPRO) 10 MG tablet, escitalopram 10 mg tablet, Disp: , Rfl:   •  guaiFENesin-codeine (CHERATUSSIN AC) 100-10 MG/5ML liquid, Cheratussin AC 10 mg-100 mg/5 mL oral liquid, Disp: , Rfl:   •  IMVEXXY MAINTENANCE PACK 10 MCG insert, , Disp: , Rfl:   •  lovastatin (MEVACOR) 40 MG tablet, lovastatin 40 mg tablet, Disp: , Rfl:   •  Neomycin-Polymyxin-Dexameth 0.1 % ointment, neomycin-polymyxin-dexameth 3.5 mg/mL-10,000 unit/mL-0.1% eye drops, Disp: , Rfl:   •  pantoprazole (PROTONIX) 40 MG EC tablet, pantoprazole 40 mg tablet,delayed release, Disp: , Rfl:   •  phenazopyridine (PYRIDIUM) 200 MG tablet, phenazopyridine 200 mg tablet  take 1 tablet by mouth three times a day, Disp: , Rfl:   •  prednisoLONE acetate (PRED FORTE) 1 % ophthalmic suspension, prednisolone acetate 1 % eye drops,suspension, Disp: , Rfl:   •  predniSONE (DELTASONE) 20 MG tablet, prednisone 20 mg tablet, Disp: , Rfl:   •  pseudoephedrine-acetaminophen (TYLENOL SINUS)  MG tablet, Asprin Ec Low Dose 81 mg tablet,delayed release  Take 1 tablet every day by oral route., Disp: , Rfl:   •  raNITIdine (ZANTAC) 150 MG tablet, ranitidine 150 mg tablet, Disp: , Rfl:   •  TAZORAC 0.1 % gel, APPLY A PEA SIZED AMOUNT TO FACE NIGHTLY AS TOLERATED, Disp: , Rfl:   •  triamcinolone (KENALOG) 0.1 % lotion, triamcinolone acetonide 0.1 % lotion  APPLY A THIN LAYER TO THE AFFECTED AREA(S) BY TOPICAL ROUTE 2 TIMES PER DAY: Not for use on face, Disp: , Rfl:   No Known Allergies  Vitals:    20 1106   Weight: 65.8  "kg (145 lb)   Height: 157.5 cm (62\")       Physical Exam   Constitutional: She is oriented to person, place, and time. She appears well-developed and well-nourished.   HENT:   Head: Normocephalic.   Pulmonary/Chest: Effort normal.   Musculoskeletal: She exhibits edema and tenderness.        Left shoulder: She exhibits tenderness, crepitus, deformity, laceration, pain, spasm and decreased strength. She exhibits normal range of motion, no bony tenderness, no swelling, no effusion and normal pulse.   Right foot 5th phalynx nttp and no deformity.   Neurological: She is alert and oriented to person, place, and time. She displays normal reflexes. No cranial nerve deficit or sensory deficit. She exhibits normal muscle tone. Coordination normal.   Skin: Skin is warm and dry.   Psychiatric: She has a normal mood and affect. Her behavior is normal. Judgment and thought content normal.   Nursing note and vitals reviewed.        Assessment  Encounter Diagnoses   Name Primary?   • Closed fracture of phalanx of right fifth toe with routine healing, subsequent encounter Yes   • Primary osteoarthritis of left shoulder    • Rotator cuff arthropathy of left shoulder          Discussion/Summary      End of Encounter Orders  Orders Placed This Encounter   Procedures   • Large Joint Arthrocentesis: L subacromial bursa     This order was created via procedure documentation       Follow Up  Patient was told to schedule follow up in 3month(s)        DEBBY Mckeon   "

## 2020-11-04 ENCOUNTER — CLINICAL SUPPORT (OUTPATIENT)
Dept: ORTHOPEDIC SURGERY | Facility: CLINIC | Age: 65
End: 2020-11-04

## 2020-11-04 VITALS — HEIGHT: 62 IN | TEMPERATURE: 97.3 F | BODY MASS INDEX: 26.68 KG/M2 | WEIGHT: 145 LBS

## 2020-11-04 DIAGNOSIS — M19.012 PRIMARY OSTEOARTHRITIS OF LEFT SHOULDER: Primary | ICD-10-CM

## 2020-11-04 PROCEDURE — 20610 DRAIN/INJ JOINT/BURSA W/O US: CPT | Performed by: PHYSICIAN ASSISTANT

## 2020-11-04 RX ORDER — METHYLPREDNISOLONE ACETATE 80 MG/ML
160 INJECTION, SUSPENSION INTRA-ARTICULAR; INTRALESIONAL; INTRAMUSCULAR; SOFT TISSUE
Status: COMPLETED | OUTPATIENT
Start: 2020-11-04 | End: 2020-11-04

## 2020-11-04 RX ORDER — VALACYCLOVIR HYDROCHLORIDE 1 G/1
TABLET, FILM COATED ORAL
COMMUNITY
Start: 2020-10-24

## 2020-11-04 RX ORDER — LOVASTATIN 20 MG/1
TABLET ORAL
COMMUNITY
Start: 2020-10-05

## 2020-11-04 RX ADMIN — METHYLPREDNISOLONE ACETATE 160 MG: 80 INJECTION, SUSPENSION INTRA-ARTICULAR; INTRALESIONAL; INTRAMUSCULAR; SOFT TISSUE at 09:41

## 2020-11-04 NOTE — PROGRESS NOTES
Procedure   Large Joint Arthrocentesis: L glenohumeral  Date/Time: 11/4/2020 9:41 AM  Consent given by: patient  Site marked: site marked  Timeout: Immediately prior to procedure a time out was called to verify the correct patient, procedure, equipment, support staff and site/side marked as required   Supporting Documentation  Indications: pain and joint swelling   Procedure Details  Location: shoulder - L glenohumeral  Preparation: Patient was prepped and draped in the usual sterile fashion  Needle size: 25 G  Approach: posterior  Medications administered: 2 mL lidocaine (cardiac); 160 mg methylPREDNISolone acetate 80 MG/ML  Patient tolerance: patient tolerated the procedure well with no immediate complications      Electronically signed by Brad Hackett PA-C, 11/04/20, 11:15 AM EST.

## 2020-11-04 NOTE — PROGRESS NOTES
INJECTION      Patient: Shanna Espinoza    MRN: [id number]    YOB: 1955    Chief Complaint   Patient presents with   • Left Shoulder - Follow-up, Pain   • Injections         History of Present Illness:  Shanna Espinoza is here today for injection therapy. She receives LEFT shoulder injections of steroid. Since last injection, patient notes substantial relief of symptoms. She inquires today about whether she needs an MRI of the shoulder to see how bad the joint is. Treatment options have been discussed and she understands and consents.       Physical Exam:   65 y.o. female awake, alert, oriented, in no acute distress and well developed, well nourished.  LEFT shoulder  Active and passive range of motion limited, although passive less than active.    There is obvious crepitus noted during motion testing.    Overall strength/rotator cuff function is mildly impaired.    AC joint is tender upon palpation.    Axillary nerve function is well-preserved.    There is slight winging of the scapula with hollowing of the fossae over the proximal and distal aspects of the spine of the scapula.    Active range of motion as follows: forward flexion is 0-120 degrees, active abduction is 0-110 degrees.    Drop arm sign is negative.    Skin and soft tissues are normal.    Patient's pain level is 4.      Procedures  See separate procedure note  Injection site was identified by physical examination and cleaned with Betadine and alcohol swabs. Prior to needle insertion, ethyl chloride spray was used for surface anesthesia. Sterile technique was used.       ASSESSMENT:  Diagnoses and all orders for this visit:    1. Primary osteoarthritis of left shoulder (Primary)  -     Large Joint Arthrocentesis: L glenohumeral          PLAN:   • Left shoulder steroid injection was discussed with the patient. Discussed indication, risks, benefits, and alternatives. Verbal consent was given to proceed with the procedure.   • Injection was  performed from posterior approach.  Patient tolerated the procedure well and no complications were encountered.  • Discussion of orthopedic goals and activities and patient/guardian expressed understanding.  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 Eventually she will need a reverse total shoulder arthroplasty and she is aware of that.  • Follow up in 3 months      Brad Hackett PA-C  Encounter Date: 11/4/2020    Electronically signed by Brad Hackett PA-C, 11/04/20, 11:17 AM EST.      MARY Dragon/Transcription disclaimer:  Much of this encounter note is an electronic transcription/translation of spoken language to printed text. The electronic translation of spoken language may permit erroneous, or at times, nonsensical words or phrases to be inadvertently transcribed; Although I have reviewed the note for such errors, some may still exist.

## 2021-01-08 ENCOUNTER — APPOINTMENT (OUTPATIENT)
Dept: WOMENS IMAGING | Facility: HOSPITAL | Age: 66
End: 2021-01-08

## 2021-01-08 PROCEDURE — 77067 SCR MAMMO BI INCL CAD: CPT | Performed by: RADIOLOGY

## 2021-01-08 PROCEDURE — 77063 BREAST TOMOSYNTHESIS BI: CPT | Performed by: RADIOLOGY

## 2021-02-17 ENCOUNTER — CLINICAL SUPPORT (OUTPATIENT)
Dept: ORTHOPEDIC SURGERY | Facility: CLINIC | Age: 66
End: 2021-02-17

## 2021-02-17 VITALS — TEMPERATURE: 95.7 F | WEIGHT: 145 LBS | HEIGHT: 62 IN | BODY MASS INDEX: 26.68 KG/M2

## 2021-02-17 DIAGNOSIS — M19.012 PRIMARY OSTEOARTHRITIS OF LEFT SHOULDER: Primary | ICD-10-CM

## 2021-02-17 PROCEDURE — 20610 DRAIN/INJ JOINT/BURSA W/O US: CPT | Performed by: PHYSICIAN ASSISTANT

## 2021-02-17 RX ORDER — LIDOCAINE HYDROCHLORIDE 10 MG/ML
2 INJECTION, SOLUTION INFILTRATION; PERINEURAL
Status: COMPLETED | OUTPATIENT
Start: 2021-02-17 | End: 2021-02-17

## 2021-02-17 RX ORDER — METHYLPREDNISOLONE ACETATE 80 MG/ML
160 INJECTION, SUSPENSION INTRA-ARTICULAR; INTRALESIONAL; INTRAMUSCULAR; SOFT TISSUE
Status: COMPLETED | OUTPATIENT
Start: 2021-02-17 | End: 2021-02-17

## 2021-02-17 RX ADMIN — LIDOCAINE HYDROCHLORIDE 2 ML: 10 INJECTION, SOLUTION INFILTRATION; PERINEURAL at 09:23

## 2021-02-17 RX ADMIN — METHYLPREDNISOLONE ACETATE 160 MG: 80 INJECTION, SUSPENSION INTRA-ARTICULAR; INTRALESIONAL; INTRAMUSCULAR; SOFT TISSUE at 09:23

## 2021-02-17 NOTE — PROGRESS NOTES
Procedure   Large Joint Arthrocentesis: L glenohumeral  Date/Time: 2/17/2021 9:23 AM  Consent given by: patient  Site marked: site marked  Timeout: Immediately prior to procedure a time out was called to verify the correct patient, procedure, equipment, support staff and site/side marked as required   Supporting Documentation  Indications: pain   Procedure Details  Location: shoulder - L glenohumeral  Preparation: Patient was prepped and draped in the usual sterile fashion  Needle size: 25 G  Approach: posterior  Medications administered: 2 mL lidocaine 1 %; 160 mg methylPREDNISolone acetate 80 MG/ML  Patient tolerance: patient tolerated the procedure well with no immediate complications      Electronically signed by Brad Hackett PA-C, 02/17/21, 9:50 AM EST.

## 2021-02-17 NOTE — PROGRESS NOTES
"Chief Complaint  Follow-up and Pain of the Left Shoulder and Injections (left shoulder)    Subjective    History of Present Illness      Shanna Espinoza is a 65 y.o. female who presents to [facility] for is here today for injection therapy. She receives LEFT shoulder injections of steroid. Since last injection, patient notes substantial relief of symptoms.  She reports she can tell the shot is just starting to wear off. Treatment options have been discussed and she understands and consents.       Objective   Vital Signs:   Temp 95.7 °F (35.4 °C)   Ht 157.5 cm (62\")   Wt 65.8 kg (145 lb)   BMI 26.52 kg/m²     Physical Exam  65 y.o. female is awake, alert, oriented, in no acute distress and well developed, well nourished.  Ortho Exam   LEFT shoulder  Active and passive range of motion limited, although passive less than active.    There is obvious crepitus noted during motion testing.    Overall strength/rotator cuff function is mildly impaired.    AC joint is tender upon palpation.    Axillary nerve function is well-preserved.    There is slight winging of the scapula with hollowing of the fossae over the proximal and distal aspects of the spine of the scapula.    Active range of motion as follows: forward flexion is 0-120 degrees, active abduction is 0-110 degrees.    Drop arm sign is negative.    Skin and soft tissues are normal.    Patient's pain level is 3.      Procedures   See separate procedure note  Injection site was identified by physical examination and cleaned with Betadine and alcohol swabs. Prior to needle insertion, ethyl chloride spray was used for surface anesthesia. Sterile technique was used.       Assessment   Assessment and Plan    Problem List Items Addressed This Visit        Musculoskeletal and Injuries    Primary osteoarthritis of left shoulder - Primary    Relevant Orders    Large Joint Arthrocentesis: L glenohumeral          Follow Up   • Left shoulder steroid " injection was discussed with the patient. Discussed indication, risks, benefits, and alternatives. Verbal consent was given to proceed with the procedure.   • Injection was performed from posterior approach.  Patient tolerated the procedure well and no complications were encountered.  • Discussion of orthopedic goals and activities and patient/guardian expressed understanding.  • Ice, heat, rest, compression and elevation of extremity as beneficial  • nsaids and/or tylenol as beneficial  • Instructed to refrain from heavy activity/rest the extremity for the next 24-48 hours  • Discussion regarding possibility of cortisol flare and what to expect if this occurs  • Watch for signs and symptoms of infection  • Call if adverse effect from injection therapy  • Follow up in 3 months  • Patient was given instructions and counseling regarding her condition or for health maintenance advice. Please see specific information pulled into the AVS if appropriate.     Brad Hackett PA-C   Date of Encounter: 2/17/2021   Electronically signed by Brad Hackett PA-C, 02/17/21, 9:50 AM EST.     EMR Dragon/Transcription disclaimer:  Much of this encounter note is an electronic transcription/translation of spoken language to printed text. The electronic translation of spoken language may permit erroneous, or at times, nonsensical words or phrases to be inadvertently transcribed; Although I have reviewed the note for such errors, some may still exist.

## 2021-04-15 DIAGNOSIS — Z12.11 SCREENING FOR COLON CANCER: Primary | ICD-10-CM

## 2021-04-19 ENCOUNTER — OUTSIDE FACILITY SERVICE (OUTPATIENT)
Dept: GASTROENTEROLOGY | Facility: CLINIC | Age: 66
End: 2021-04-19

## 2021-04-19 PROCEDURE — G0500 MOD SEDAT ENDO SERVICE >5YRS: HCPCS | Performed by: INTERNAL MEDICINE

## 2021-04-19 PROCEDURE — G0105 COLORECTAL SCRN; HI RISK IND: HCPCS | Performed by: INTERNAL MEDICINE

## 2021-05-21 ENCOUNTER — CLINICAL SUPPORT (OUTPATIENT)
Dept: ORTHOPEDIC SURGERY | Facility: CLINIC | Age: 66
End: 2021-05-21

## 2021-05-21 VITALS — WEIGHT: 145 LBS | TEMPERATURE: 97.6 F | HEIGHT: 62 IN | BODY MASS INDEX: 26.68 KG/M2

## 2021-05-21 DIAGNOSIS — M19.012 PRIMARY OSTEOARTHRITIS OF LEFT SHOULDER: Primary | ICD-10-CM

## 2021-05-21 PROCEDURE — 20610 DRAIN/INJ JOINT/BURSA W/O US: CPT | Performed by: PHYSICIAN ASSISTANT

## 2021-05-21 RX ORDER — LORATADINE 10 MG/1
CAPSULE, LIQUID FILLED ORAL
COMMUNITY

## 2021-05-21 RX ORDER — DEXAMETHASONE SODIUM PHOSPHATE 10 MG/ML
0.5 INJECTION, SOLUTION INTRAMUSCULAR; INTRAVENOUS
COMMUNITY

## 2021-05-21 RX ORDER — METHYLPREDNISOLONE ACETATE 80 MG/ML
160 INJECTION, SUSPENSION INTRA-ARTICULAR; INTRALESIONAL; INTRAMUSCULAR; SOFT TISSUE
Status: COMPLETED | OUTPATIENT
Start: 2021-05-21 | End: 2021-05-21

## 2021-05-21 RX ADMIN — METHYLPREDNISOLONE ACETATE 160 MG: 80 INJECTION, SUSPENSION INTRA-ARTICULAR; INTRALESIONAL; INTRAMUSCULAR; SOFT TISSUE at 09:00

## 2021-05-21 NOTE — PROGRESS NOTES
"Chief Complaint  Follow-up and Pain of the Left Shoulder    Subjective    History of Present Illness      Shanna Espinoza is a 65 y.o. female who presents to [facility] for is here today for injection therapy. She receives LEFT shoulder injections of steroid. Since last injection, patient notes substantial relief of symptoms. She reports it is still working really well and she may try going longer between the injections. Treatment options have been discussed and she understands and consents.       Objective   Vital Signs:   Temp 97.6 °F (36.4 °C)   Ht 157.5 cm (62.01\")   Wt 65.8 kg (145 lb)   BMI 26.51 kg/m²     Physical Exam  65 y.o. female is awake, alert, oriented, in no acute distress and well developed, well nourished.  Ortho Exam   LEFT shoulder  Active and passive range of motion limited, although passive less than active.    There is obvious crepitus noted during motion testing.    Overall strength/rotator cuff function is mildly impaired.    AC joint is tender upon palpation.    Axillary nerve function is well-preserved.    There is slight winging of the scapula with hollowing of the fossae over the proximal and distal aspects of the spine of the scapula.    Active range of motion as follows: forward flexion is 0-120 degrees, active abduction is 0-110 degrees.    Drop arm sign is negative.    Skin and soft tissues are normal.    Patient's pain level is 3.      PROCEDURE  Large Joint Arthrocentesis: L glenohumeral  Date/Time: 5/21/2021 9:00 AM  Consent given by: patient  Site marked: site marked  Timeout: Immediately prior to procedure a time out was called to verify the correct patient, procedure, equipment, support staff and site/side marked as required   Supporting Documentation  Indications: pain and joint swelling   Procedure Details  Location: shoulder - L glenohumeral  Preparation: Patient was prepped and draped in the usual sterile fashion  Needle size: 25 G  Approach: " anterolateral  Medications administered: 160 mg methylPREDNISolone acetate 80 MG/ML; 2 mL lidocaine (cardiac)  Patient tolerance: patient tolerated the procedure well with no immediate complications      Injection site was identified by physical examination and cleaned with Betadine and alcohol swabs. Prior to needle insertion, ethyl chloride spray was used for surface anesthesia. Sterile technique was used.       Assessment   Assessment and Plan    Problem List Items Addressed This Visit        Musculoskeletal and Injuries    Primary osteoarthritis of left shoulder - Primary          Follow Up   • Left shoulder steroid injection was discussed with the patient. Discussed indication, risks, benefits, and alternatives. Verbal consent was given to proceed with the procedure.   • Injection was performed from anteromedial approach.  Patient tolerated the procedure well and no complications were encountered.  • Discussion of orthopedic goals and activities and patient/guardian expressed understanding.  • Ice, heat, rest, compression and elevation of extremity as beneficial  • nsaids and/or tylenol as beneficial  • Instructed to refrain from heavy activity/rest the extremity for the next 24-48 hours  • Discussion regarding possibility of cortisol flare and what to expect if this occurs  • Watch for signs and symptoms of infection  • Call if adverse effect from injection therapy  • Follow up in 3 months  • Patient was given instructions and counseling regarding her condition or for health maintenance advice. Please see specific information pulled into the AVS if appropriate.     Brad Hackett PA-C   Date of Encounter: 5/21/2021     Electronically signed by Brad Hackett PA-C, 05/21/21, 9:19 AM EDT.   EMR Dragon/Transcription disclaimer:  Much of this encounter note is an electronic transcription/translation of spoken language to printed text. The electronic translation of spoken language may permit erroneous,  or at times, nonsensical words or phrases to be inadvertently transcribed; Although I have reviewed the note for such errors, some may still exist.

## 2021-10-15 ENCOUNTER — TELEPHONE (OUTPATIENT)
Dept: ORTHOPEDIC SURGERY | Facility: CLINIC | Age: 66
End: 2021-10-15

## 2021-10-15 NOTE — TELEPHONE ENCOUNTER
Provider: AMBER  Caller: JAYCE SNIDER  Relationship to Patient: PATIENT  Pharmacy: N/A  Phone Number: [phone]  Reason for Call: PATIENT IS CALLING TO SCHEDULE CORTISONE INJECTION LT LDR  When was the patient last seen: 5.21.21

## 2021-10-27 ENCOUNTER — CLINICAL SUPPORT (OUTPATIENT)
Dept: ORTHOPEDIC SURGERY | Facility: CLINIC | Age: 66
End: 2021-10-27

## 2021-10-27 VITALS — BODY MASS INDEX: 28.52 KG/M2 | HEIGHT: 62 IN | WEIGHT: 155 LBS | TEMPERATURE: 98.3 F

## 2021-10-27 DIAGNOSIS — M19.012 PRIMARY OSTEOARTHRITIS OF LEFT SHOULDER: Primary | ICD-10-CM

## 2021-10-27 PROCEDURE — 20610 DRAIN/INJ JOINT/BURSA W/O US: CPT | Performed by: PHYSICIAN ASSISTANT

## 2021-10-27 RX ORDER — METHYLPREDNISOLONE ACETATE 80 MG/ML
160 INJECTION, SUSPENSION INTRA-ARTICULAR; INTRALESIONAL; INTRAMUSCULAR; SOFT TISSUE
Status: COMPLETED | OUTPATIENT
Start: 2021-10-27 | End: 2021-10-27

## 2021-10-27 RX ADMIN — METHYLPREDNISOLONE ACETATE 160 MG: 80 INJECTION, SUSPENSION INTRA-ARTICULAR; INTRALESIONAL; INTRAMUSCULAR; SOFT TISSUE at 09:44

## 2021-10-27 NOTE — PROGRESS NOTES
"Chief Complaint  Follow-up and Pain of the Left Shoulder    Subjective    History of Present Illness      Shanna Espinoza is a 66 y.o. female who presents to [facility] for is here today for injection therapy. She receives LEFT shoulder injections of Depo-Medrol. Since last injection, patient notes substantial relief of symptoms.  She went 4 months between injections and did great but ended up having to cancel her scheduled appointment due to another obligation.  Treatment options have been discussed and she understands and consents.       Objective   Vital Signs:   Temp 98.3 °F (36.8 °C)   Ht 157.5 cm (62\")   Wt 70.3 kg (155 lb)   BMI 28.35 kg/m²     Physical Exam  66 y.o. female is awake, alert, oriented, in no acute distress and well developed, well nourished.  Ortho Exam   LEFT shoulder  Active and passive range of motion limited, although passive less than active.    There is obvious crepitus noted during motion testing.    Overall strength/rotator cuff function is mildly impaired.    AC joint is tender upon palpation.    Axillary nerve function is well-preserved.    There is slight winging of the scapula with hollowing of the fossae over the proximal and distal aspects of the spine of the scapula.    Active range of motion as follows: forward flexion is 0-120 degrees, active abduction is 0-110 degrees.    Drop arm sign is negative.    Skin and soft tissues are normal.    Patient's pain level is 4.        Result Review :         PROCEDURE  Large Joint Arthrocentesis: L glenohumeral  Date/Time: 10/27/2021 9:44 AM  Consent given by: patient  Site marked: site marked  Timeout: Immediately prior to procedure a time out was called to verify the correct patient, procedure, equipment, support staff and site/side marked as required   Supporting Documentation  Indications: pain   Procedure Details  Location: shoulder - L glenohumeral  Preparation: Patient was prepped and draped in the usual " sterile fashion  Needle size: 25 G  Approach: posterior  Medications administered: 2 mL lidocaine (cardiac); 160 mg methylPREDNISolone acetate 80 MG/ML  Patient tolerance: patient tolerated the procedure well with no immediate complications      Injection site was identified by physical examination and cleaned with Betadine and alcohol swabs. Prior to needle insertion, ethyl chloride spray was used for surface anesthesia. Sterile technique was used.       Assessment   Assessment and Plan    Problem List Items Addressed This Visit        Musculoskeletal and Injuries    Primary osteoarthritis of left shoulder - Primary    Relevant Orders    Large Joint Arthrocentesis: L glenohumeral          Follow Up   • Left shoulder Depo-Medrol injection was discussed with the patient. Discussed indication, risks, benefits, and alternatives. Verbal consent was given to proceed with the procedure.   • Injection was performed from posterior approach.  Patient tolerated the procedure well and no complications were encountered.  • Discussion of orthopedic goals and activities and patient/guardian expressed understanding.  • Ice, heat, rest, compression and elevation of extremity as beneficial  • nsaids and/or tylenol as beneficial  • Instructed to refrain from heavy activity/rest the extremity for the next 24-48 hours  • Discussion regarding possibility of cortisol flare and what to expect if this occurs  • Watch for signs and symptoms of infection  • Call if adverse effect from injection therapy  • Follow up in 4 months  • Patient was given instructions and counseling regarding her condition or for health maintenance advice. Please see specific information pulled into the AVS if appropriate.     Brad Hackett PA-C   Date of Encounter: 10/27/2021   Electronically signed by Brad Hackett PA-C, 10/27/21, 11:14 AM EDT.      EMR Dragon/Transcription disclaimer:  Much of this encounter note is an electronic  transcription/translation of spoken language to printed text. The electronic translation of spoken language may permit erroneous, or at times, nonsensical words or phrases to be inadvertently transcribed; Although I have reviewed the note for such errors, some may still exist.

## 2022-02-14 ENCOUNTER — APPOINTMENT (OUTPATIENT)
Dept: WOMENS IMAGING | Facility: HOSPITAL | Age: 67
End: 2022-02-14

## 2022-02-14 PROCEDURE — 77067 SCR MAMMO BI INCL CAD: CPT | Performed by: RADIOLOGY

## 2022-02-14 PROCEDURE — 77063 BREAST TOMOSYNTHESIS BI: CPT | Performed by: RADIOLOGY

## 2022-02-28 ENCOUNTER — CLINICAL SUPPORT (OUTPATIENT)
Dept: ORTHOPEDIC SURGERY | Facility: CLINIC | Age: 67
End: 2022-02-28

## 2022-02-28 VITALS — TEMPERATURE: 98 F | WEIGHT: 155 LBS | HEIGHT: 62 IN | BODY MASS INDEX: 28.52 KG/M2

## 2022-02-28 DIAGNOSIS — M19.012 PRIMARY OSTEOARTHRITIS OF LEFT SHOULDER: Primary | ICD-10-CM

## 2022-02-28 PROCEDURE — 20610 DRAIN/INJ JOINT/BURSA W/O US: CPT | Performed by: PHYSICIAN ASSISTANT

## 2022-02-28 RX ORDER — METHYLPREDNISOLONE ACETATE 80 MG/ML
160 INJECTION, SUSPENSION INTRA-ARTICULAR; INTRALESIONAL; INTRAMUSCULAR; SOFT TISSUE
Status: COMPLETED | OUTPATIENT
Start: 2022-02-28 | End: 2022-02-28

## 2022-02-28 RX ORDER — LIDOCAINE HYDROCHLORIDE 10 MG/ML
2 INJECTION, SOLUTION EPIDURAL; INFILTRATION; INTRACAUDAL; PERINEURAL
Status: COMPLETED | OUTPATIENT
Start: 2022-02-28 | End: 2022-02-28

## 2022-02-28 RX ADMIN — METHYLPREDNISOLONE ACETATE 160 MG: 80 INJECTION, SUSPENSION INTRA-ARTICULAR; INTRALESIONAL; INTRAMUSCULAR; SOFT TISSUE at 09:56

## 2022-02-28 RX ADMIN — LIDOCAINE HYDROCHLORIDE 2 ML: 10 INJECTION, SOLUTION EPIDURAL; INFILTRATION; INTRACAUDAL; PERINEURAL at 09:56

## 2022-02-28 NOTE — PROGRESS NOTES
"Chief Complaint  Follow-up and Pain of the Left Shoulder and Injections    Subjective    History of Present Illness      Shanna Espinoza is a 66 y.o. female who presents to [facility] for is here today for injection therapy. She receives LEFT shoulder injections of Depo-Medrol. Since last injection, patient notes substantial relief of symptoms.  Treatment options have been discussed and she understands and consents.       Objective   Vital Signs:   Temp 98 °F (36.7 °C)   Ht 157.5 cm (62.01\")   Wt 70.3 kg (155 lb)   BMI 28.34 kg/m²     Physical Exam  66 y.o. female is awake, alert, oriented, in no acute distress and well developed, well nourished.  Ortho Exam   LEFT shoulder  Active and passive range of motion limited, although passive less than active.    There is obvious crepitus noted during motion testing.    Overall strength/rotator cuff function is mildly impaired.    AC joint is tender upon palpation.    Axillary nerve function is well-preserved.    There is slight winging of the scapula with hollowing of the fossae over the proximal and distal aspects of the spine of the scapula.    Active range of motion as follows: forward flexion is 0-120 degrees, active abduction is 0-110 degrees.    Drop arm sign is negative.    Skin and soft tissues are normal.    Patient's pain level is 3.        Result Review :         PROCEDURE  Large Joint Arthrocentesis: L glenohumeral  Date/Time: 2/28/2022 9:56 AM  Consent given by: patient  Site marked: site marked  Timeout: Immediately prior to procedure a time out was called to verify the correct patient, procedure, equipment, support staff and site/side marked as required   Supporting Documentation  Indications: pain and joint swelling   Procedure Details  Location: shoulder - L glenohumeral  Preparation: Patient was prepped and draped in the usual sterile fashion  Needle size: 25 G  Approach: posterior  Medications administered: 160 mg " methylPREDNISolone acetate 80 MG/ML; 2 mL lidocaine PF 1% 1 %  Patient tolerance: patient tolerated the procedure well with no immediate complications      Injection site was identified by physical examination and cleaned with Betadine and alcohol swabs. Prior to needle insertion, ethyl chloride spray was used for surface anesthesia. Sterile technique was used.       Assessment   Assessment and Plan    Problem List Items Addressed This Visit        Musculoskeletal and Injuries    Primary osteoarthritis of left shoulder - Primary    Relevant Orders    Large Joint Arthrocentesis: L glenohumeral          Follow Up   • Left shoulder Depo-Medrol injection was discussed with the patient. Discussed indication, risks, benefits, and alternatives. Verbal consent was given to proceed with the procedure.   • Injection was performed from posterior approach.  Patient tolerated the procedure well and no complications were encountered.  • Discussion of orthopedic goals and activities and patient/guardian expressed understanding.  • Ice, heat, rest, compression and elevation of extremity as beneficial  • nsaids and/or tylenol as beneficial  • Instructed to refrain from heavy activity/rest the extremity for the next 24-48 hours  • Discussion regarding possibility of cortisol flare and what to expect if this occurs  • Watch for signs and symptoms of infection  • Call if adverse effect from injection therapy  • Follow up in 4 months  • Patient was given instructions and counseling regarding her condition or for health maintenance advice. Please see specific information pulled into the AVS if appropriate.     Brad Hackett PA-C   Date of Encounter: 2/28/2022   Electronically signed by Brad Hackett PA-C, 02/28/22, 10:09 AM EST.     MARY Dragon/Transcription disclaimer:  Much of this encounter note is an electronic transcription/translation of spoken language to printed text. The electronic translation of spoken language may  permit erroneous, or at times, nonsensical words or phrases to be inadvertently transcribed; Although I have reviewed the note for such errors, some may still exist.

## 2022-06-27 ENCOUNTER — CLINICAL SUPPORT (OUTPATIENT)
Dept: ORTHOPEDIC SURGERY | Facility: CLINIC | Age: 67
End: 2022-06-27

## 2022-06-27 VITALS — BODY MASS INDEX: 29.11 KG/M2 | WEIGHT: 158.2 LBS | TEMPERATURE: 98.2 F | HEIGHT: 62 IN

## 2022-06-27 DIAGNOSIS — M19.012 PRIMARY OSTEOARTHRITIS OF LEFT SHOULDER: Primary | ICD-10-CM

## 2022-06-27 PROCEDURE — 20610 DRAIN/INJ JOINT/BURSA W/O US: CPT | Performed by: PHYSICIAN ASSISTANT

## 2022-06-27 RX ORDER — METHYLPREDNISOLONE ACETATE 80 MG/ML
160 INJECTION, SUSPENSION INTRA-ARTICULAR; INTRALESIONAL; INTRAMUSCULAR; SOFT TISSUE
Status: COMPLETED | OUTPATIENT
Start: 2022-06-27 | End: 2022-06-27

## 2022-06-27 RX ADMIN — METHYLPREDNISOLONE ACETATE 160 MG: 80 INJECTION, SUSPENSION INTRA-ARTICULAR; INTRALESIONAL; INTRAMUSCULAR; SOFT TISSUE at 09:58

## 2022-06-27 NOTE — PROGRESS NOTES
"Chief Complaint  Follow-up of the Left Shoulder    Subjective    History of Present Illness      Shanna Espinoza is a 67 y.o. female who presents to [facility] for is here today for injection therapy. She receives LEFT shoulder injections of Depo-Medrol. Since last injection, patient notes substantial relief of symptoms. No issues with last injection. Treatment options have been discussed and she understands and consents.       Objective   Vital Signs:   Temp 98.2 °F (36.8 °C)   Ht 157.5 cm (62\")   Wt 71.8 kg (158 lb 3.2 oz)   BMI 28.94 kg/m²     Physical Exam  67 y.o. female is awake, alert, oriented, in no acute distress and well developed, well nourished.  Ortho Exam   LEFT shoulder  Active and passive range of motion limited, although passive less than active.    There is obvious crepitus noted during motion testing.    Overall strength/rotator cuff function is mildly impaired.    AC joint is tender upon palpation.    Axillary nerve function is well-preserved.    There is slight winging of the scapula with hollowing of the fossae over the proximal and distal aspects of the spine of the scapula.    Active range of motion as follows: forward flexion is 0-120 degrees, active abduction is 0-110 degrees.    Drop arm sign is negative.    Skin and soft tissues are normal.    Patient's pain level is 3.        Result Review :         PROCEDURE  Large Joint Arthrocentesis: L glenohumeral  Date/Time: 6/27/2022 9:58 AM  Consent given by: patient  Site marked: site marked  Timeout: Immediately prior to procedure a time out was called to verify the correct patient, procedure, equipment, support staff and site/side marked as required   Supporting Documentation  Indications: pain   Procedure Details  Location: shoulder - L glenohumeral  Preparation: Patient was prepped and draped in the usual sterile fashion  Needle size: 25 G  Approach: posterior  Medications administered: 160 mg " methylPREDNISolone acetate 80 MG/ML  Patient tolerance: patient tolerated the procedure well with no immediate complications           Injection site was identified by physical examination and cleaned with Betadine and alcohol swabs. Prior to needle insertion, ethyl chloride spray was used for surface anesthesia. Sterile technique was used.       Assessment   Assessment and Plan    Problem List Items Addressed This Visit        Musculoskeletal and Injuries    Primary osteoarthritis of left shoulder - Primary    Relevant Orders    Large Joint Arthrocentesis: L glenohumeral          Follow Up   • Left shoulder Depo-Medrol injection was discussed with the patient. Discussed indication, risks, benefits, and alternatives. Verbal consent was given to proceed with the procedure.   • Injection was performed from posterior approach.  Patient tolerated the procedure well and no complications were encountered.  • Discussion of orthopedic goals and activities and patient/guardian expressed understanding.  • Ice, heat, rest, compression and elevation of extremity as beneficial  • nsaids and/or tylenol as beneficial  • Instructed to refrain from heavy activity/rest the extremity for the next 24-48 hours  • Discussion regarding possibility of cortisol flare and what to expect if this occurs  • Watch for signs and symptoms of infection  • Call if adverse effect from injection therapy  • Follow up in 4 months  • Patient was given instructions and counseling regarding her condition or for health maintenance advice. Please see specific information pulled into the AVS if appropriate.     Brad Hackett PA-C   Date of Encounter: 6/27/2022   Electronically signed by Brad Hackett PA-C, 06/27/22, 10:01 AM EDT.     EMR Dragon/Transcription disclaimer:  Much of this encounter note is an electronic transcription/translation of spoken language to printed text. The electronic translation of spoken language may permit erroneous, or  at times, nonsensical words or phrases to be inadvertently transcribed; Although I have reviewed the note for such errors, some may still exist.

## 2022-06-27 NOTE — PROGRESS NOTES
"Chief Complaint  Follow-up of the Left Shoulder    Subjective    History of Present Illness {CC  Problem List  Visit Diagnosis   Encounters  Notes  Medications  Labs  Result Review Imaging  Media :23}     Shanna Espinoza is a 67 y.o. female who presents to [facility] for {[facility] Visit Reason/injection hpi:86943::\"injection therapy.\"} She {[facility] Receives Is Receivin} {AS Body Parts:18674} injections of {AS Joint Injection Medication Choices:83354}. {[facility] () HPI injection:03225} Treatment options have been discussed and she understands and consents.       Objective   Vital Signs:   Temp 98.2 °F (36.8 °C)   Ht 157.5 cm (62\")   Wt 71.8 kg (158 lb 3.2 oz)   BMI 28.94 kg/m²     Physical Exam  67 y.o. female is awake, alert, oriented, in no acute distress and well developed, well nourished.  Ortho Exam   {[facility] body part:76717}  {[facility] MS Exams for Injection:54516}      Result Review :{ Labs  Result Review  Imaging  Med Tab  Media :23}   {Data reviewed (optional):92645}  {Diagnostics options AS:91827}    PROCEDURE  Procedures   {[facility] Procedure Statement (Optional):11426::\"See separate procedure note\"}    Injection site was identified by physical examination and cleaned with Betadine and alcohol swabs. Prior to needle insertion, ethyl chloride spray was used for surface anesthesia. Sterile technique was used.       Assessment   Assessment and Plan { Problem List  Visit Diagnosis  ROS  Review (Popup)  Health Maintenance  Quality  BestPractice  Medications  SmartSets  SnapShot Encounters  Media :23}   Problem List Items Addressed This Visit        Musculoskeletal and Injuries    Primary osteoarthritis of left shoulder - Primary        {Time Spent (Optional):65886}  Follow Up {Instructions Charge Capture  Follow-up Communications :23}  • {Right, left-initial cap:5887} {Body Location for [facility]:89268} {Joint Injection Medication Choices:14885} " "injection was discussed with the patient. Discussed indication, risks, benefits, and alternatives. Verbal consent was given to proceed with the procedure.   • Injection was performed from {anteromedial, anterolateral:30923} approach.  Patient tolerated the procedure well and no complications were encountered.  • {[facility]oxann Post Injecion Advice:93922::\"Discussion of orthopedic goals and activities and patient/guardian expressed understanding.\",\"Ice, heat, rest, compression and elevation of extremity as beneficial\",\"Instructed to refrain from heavy activity/rest the extremity for the next 24-48 hours\",\"Discussion regarding possibility of cortisol flare and what to expect if this occurs\",\"Watch for signs and symptoms of infection\",\"Call if adverse effect from injection therapy\"}  • {Roxann Follow Up:33712}  • Patient was given instructions and counseling regarding her condition or for health maintenance advice. Please see specific information pulled into the AVS if appropriate.     Katelyn Lang MA   Date of Encounter: 6/27/2022   Electronically signed by Katelyn Lang MA, 06/27/22, 9:52 AM EDT.     EMR Dragon/Transcription disclaimer:  Much of this encounter note is an electronic transcription/translation of spoken language to printed text. The electronic translation of spoken language may permit erroneous, or at times, nonsensical words or phrases to be inadvertently transcribed; Although I have reviewed the note for such errors, some may still exist.   "

## 2023-02-21 ENCOUNTER — TELEPHONE (OUTPATIENT)
Dept: ORTHOPEDIC SURGERY | Facility: CLINIC | Age: 68
End: 2023-02-21
Payer: MEDICARE

## 2023-02-21 NOTE — TELEPHONE ENCOUNTER
Caller: JAYCE SNIDER    Relationship to patient: SELF    Best call back number:  [phone]    Chief complaint: PATIENT REQUESTING APPT. WITH AS FOR LEFT SHOULDER INJECTION.    Type of visit:  F/U/INJ

## 2023-02-27 ENCOUNTER — CLINICAL SUPPORT (OUTPATIENT)
Dept: ORTHOPEDIC SURGERY | Facility: CLINIC | Age: 68
End: 2023-02-27
Payer: MEDICARE

## 2023-02-27 VITALS — WEIGHT: 155 LBS | BODY MASS INDEX: 28.52 KG/M2 | TEMPERATURE: 97.6 F | HEIGHT: 62 IN

## 2023-02-27 DIAGNOSIS — M19.012 PRIMARY OSTEOARTHRITIS OF LEFT SHOULDER: Primary | ICD-10-CM

## 2023-02-27 PROCEDURE — 20610 DRAIN/INJ JOINT/BURSA W/O US: CPT | Performed by: PHYSICIAN ASSISTANT

## 2023-02-27 RX ADMIN — LIDOCAINE HYDROCHLORIDE 2 ML: 10 INJECTION, SOLUTION EPIDURAL; INFILTRATION; INTRACAUDAL; PERINEURAL at 15:56

## 2023-02-27 RX ADMIN — METHYLPREDNISOLONE ACETATE 160 MG: 80 INJECTION, SUSPENSION INTRA-ARTICULAR; INTRALESIONAL; INTRAMUSCULAR; SOFT TISSUE at 15:56

## 2023-03-06 RX ORDER — LIDOCAINE HYDROCHLORIDE 10 MG/ML
2 INJECTION, SOLUTION EPIDURAL; INFILTRATION; INTRACAUDAL; PERINEURAL
Status: COMPLETED | OUTPATIENT
Start: 2023-02-27 | End: 2023-02-27

## 2023-03-06 RX ORDER — METHYLPREDNISOLONE ACETATE 80 MG/ML
160 INJECTION, SUSPENSION INTRA-ARTICULAR; INTRALESIONAL; INTRAMUSCULAR; SOFT TISSUE
Status: COMPLETED | OUTPATIENT
Start: 2023-02-27 | End: 2023-02-27

## 2023-09-21 ENCOUNTER — TELEPHONE (OUTPATIENT)
Dept: ORTHOPEDIC SURGERY | Facility: CLINIC | Age: 68
End: 2023-09-21
Payer: MEDICARE

## 2023-09-21 NOTE — TELEPHONE ENCOUNTER
Provider: JUSTINO CLARK    Caller: JAYCE SNIDER    Relationship to Patient: SELF    Phone Number: [phone]    Reason for Call: PATIENT CALLED TO SCHEDULE INJECTION FOR THE LEFT SHOULDER. LAST INJECTION DONE 02/27/23. PLEASE ADVISE.

## 2023-09-26 ENCOUNTER — CLINICAL SUPPORT (OUTPATIENT)
Dept: ORTHOPEDIC SURGERY | Facility: CLINIC | Age: 68
End: 2023-09-26
Payer: MEDICARE

## 2023-09-26 VITALS — HEIGHT: 62 IN | BODY MASS INDEX: 27.6 KG/M2 | WEIGHT: 150 LBS | TEMPERATURE: 97.8 F

## 2023-09-26 DIAGNOSIS — M19.012 PRIMARY OSTEOARTHRITIS OF LEFT SHOULDER: Primary | ICD-10-CM

## 2023-09-26 RX ORDER — METHYLPREDNISOLONE ACETATE 80 MG/ML
160 INJECTION, SUSPENSION INTRA-ARTICULAR; INTRALESIONAL; INTRAMUSCULAR; SOFT TISSUE
Status: COMPLETED | OUTPATIENT
Start: 2023-09-26 | End: 2023-09-26

## 2023-09-26 RX ORDER — LIDOCAINE HYDROCHLORIDE 10 MG/ML
2 INJECTION, SOLUTION EPIDURAL; INFILTRATION; INTRACAUDAL; PERINEURAL
Status: COMPLETED | OUTPATIENT
Start: 2023-09-26 | End: 2023-09-26

## 2023-09-26 RX ADMIN — LIDOCAINE HYDROCHLORIDE 2 ML: 10 INJECTION, SOLUTION EPIDURAL; INFILTRATION; INTRACAUDAL; PERINEURAL at 10:32

## 2023-09-26 RX ADMIN — METHYLPREDNISOLONE ACETATE 160 MG: 80 INJECTION, SUSPENSION INTRA-ARTICULAR; INTRALESIONAL; INTRAMUSCULAR; SOFT TISSUE at 10:32

## 2023-09-26 NOTE — PROGRESS NOTES
"Chief Complaint  Follow-up and Pain of the Left Shoulder    Subjective    History of Present Illness      Shanna Espinoza is a 68 y.o. female who presents to [facility] for is here today for injection therapy. She receives LEFT shoulder injections of Depo-Medrol. Since last injection, patient notes substantial relief of symptoms.  Last injection was about 7 months ago . Treatment options have been discussed and she understands and consents.       Objective   Vital Signs:   Temp 97.8 °F (36.6 °C)   Ht 157.5 cm (62\")   Wt 68 kg (150 lb)   BMI 27.44 kg/m²     Physical Exam  68 y.o. female is awake, alert, oriented, in no acute distress and well developed, well nourished.  Ortho Exam   LEFT shoulder  Active and passive range of motion limited, although passive less than active.    There is obvious crepitus noted during motion testing.    Overall strength/rotator cuff function is mildly impaired.    AC joint is tender upon palpation.    Axillary nerve function is well-preserved.    There is slight winging of the scapula with hollowing of the fossae over the proximal and distal aspects of the spine of the scapula.    Active range of motion as follows: forward flexion is 0-120 degrees, active abduction is 0-110 degrees.    Drop arm sign is negative.    Skin and soft tissues are normal.            Result Review :         PROCEDURE  Large Joint Arthrocentesis: L subacromial bursa  Date/Time: 9/26/2023 10:32 AM  Consent given by: patient  Site marked: site marked  Timeout: Immediately prior to procedure a time out was called to verify the correct patient, procedure, equipment, support staff and site/side marked as required   Supporting Documentation  Indications: pain   Procedure Details  Location: shoulder - L subacromial bursa  Preparation: Patient was prepped and draped in the usual sterile fashion  Needle size: 25 G  Approach: posterior  Medications administered: 2 mL lidocaine PF 1% 1 %; 160 " mg methylPREDNISolone acetate 80 MG/ML  Patient tolerance: patient tolerated the procedure well with no immediate complications         Injection site was identified by physical examination and cleaned with Betadine and alcohol swabs. Prior to needle insertion, ethyl chloride spray was used for surface anesthesia. Sterile technique was used.       Assessment   Assessment and Plan    Problem List Items Addressed This Visit          Musculoskeletal and Injuries    Primary osteoarthritis of left shoulder - Primary    Relevant Orders    Large Joint Arthrocentesis: L subacromial bursa     Follow Up   Left shoulder Depo-Medrol injection was discussed with the patient. Discussed indication, risks, benefits, and alternatives. Verbal consent was given to proceed with the procedure.   Injection was performed from posterior approach.  Patient tolerated the procedure well and no complications were encountered.  Discussion of orthopedic goals and activities and patient/guardian expressed understanding.  Ice, heat, rest, compression and elevation of extremity as beneficial  nsaids and/or tylenol as beneficial  Instructed to refrain from heavy activity/rest the extremity for the next 24-48 hours  Discussion regarding possibility of cortisol flare and what to expect if this occurs  Watch for signs and symptoms of infection  Call if adverse effect from injection therapy  Follow up PRN  Patient was given instructions and counseling regarding her condition or for health maintenance advice. Please see specific information pulled into the AVS if appropriate.     Brad Hackett PA-C   Date of Encounter: 9/26/2023   Electronically signed by Brad Hackett PA-C, 09/26/23, 11:04 AM EDT.     EMR Dragon/Transcription disclaimer:  Much of this encounter note is an electronic transcription/translation of spoken language to printed text. The electronic translation of spoken language may permit erroneous, or at times, nonsensical  words or phrases to be inadvertently transcribed; Although I have reviewed the note for such errors, some may still exist.

## 2024-01-03 ENCOUNTER — HOSPITAL ENCOUNTER (OUTPATIENT)
Dept: GENERAL RADIOLOGY | Facility: HOSPITAL | Age: 69
Discharge: HOME | End: 2024-01-03
Admitting: PHYSICIAN ASSISTANT
Payer: MEDICARE

## 2024-01-03 DIAGNOSIS — S99.921A INJURY, FOOT, RIGHT, INITIAL ENCOUNTER: Primary | ICD-10-CM

## 2024-01-03 DIAGNOSIS — S99.921A INJURY, FOOT, RIGHT, INITIAL ENCOUNTER: ICD-10-CM

## 2024-01-03 PROCEDURE — 73630 X-RAY EXAM OF FOOT: CPT
